# Patient Record
Sex: MALE | Race: WHITE
[De-identification: names, ages, dates, MRNs, and addresses within clinical notes are randomized per-mention and may not be internally consistent; named-entity substitution may affect disease eponyms.]

---

## 2020-06-19 ENCOUNTER — HOSPITAL ENCOUNTER (EMERGENCY)
Dept: HOSPITAL 47 - EC | Age: 50
LOS: 1 days | Discharge: HOME | End: 2020-06-20
Payer: COMMERCIAL

## 2020-06-19 VITALS — TEMPERATURE: 99.1 F

## 2020-06-19 DIAGNOSIS — S61.210A: Primary | ICD-10-CM

## 2020-06-19 DIAGNOSIS — Z79.899: ICD-10-CM

## 2020-06-19 DIAGNOSIS — E78.5: ICD-10-CM
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
## 2020-06-19 DIAGNOSIS — Z87.891: ICD-10-CM

## 2020-06-19 DIAGNOSIS — S61.214A: ICD-10-CM

## 2020-06-19 DIAGNOSIS — S61.212A: ICD-10-CM

## 2020-06-19 DIAGNOSIS — S61.216A: ICD-10-CM

## 2020-06-19 PROCEDURE — 99283 EMERGENCY DEPT VISIT LOW MDM: CPT

## 2020-06-20 VITALS — DIASTOLIC BLOOD PRESSURE: 80 MMHG | RESPIRATION RATE: 16 BRPM | SYSTOLIC BLOOD PRESSURE: 140 MMHG | HEART RATE: 82 BPM

## 2020-06-20 NOTE — XR
EXAMINATION TYPE: XR hand complete RT

 

DATE OF EXAM: 6/20/2020

 

COMPARISON: NONE

 

HISTORY: Dogbite. Pain.

 

TECHNIQUE: 3 views

 

FINDINGS: Metacarpals are intact. I see no fracture nor dislocation. Joint spaces appear normal. Soft
 tissues show possible laceration at the second MP joint.

 

IMPRESSION: Possible laceration. No fracture seen.

## 2020-06-20 NOTE — ED
Animal Bite HPI





- General


Chief Complaint: Animal Bite


Stated Complaint: Dog bite, RT hand


Time Seen by Provider: 06/19/20 23:26


Source: patient


Mode of arrival: ambulatory


Limitations: no limitations





- History of Present Illness


Initial Comments: 


49-year-old male patient presents to the emergency department today for 

evaluation of multiple bite wounds to the right hand.  Patient states that he 

was camping and a friend's dog bit his hand.  States that he did cleanse the 

area with soap and water and applied dressings and antibiotic ointment.  States 

injury occurred approximately 6-7 hours ago.  States he is having some numbness 

to the tip of the right fourth digit so he decided to come in for further 

evaluation.  He denies any difficulty with range of motion of the fingers or 

hand.  Denies any other injuries.  States his tetanus vaccine is up-to-date.  

States the dog is up-to-date on immunizations as well.  Patient denies any 

headache, neck pain, back pain, chest pain, shortness of breath, dizziness, 

weakness, abdominal pain, nausea, vomiting, or difficulties with bowel movements

or urination.








- Related Data


                                Home Medications











 Medication  Instructions  Recorded  Confirmed


 


Rosuvastatin Calcium [Crestor] 5 mg PO DAILY 06/19/20 06/19/20








                                  Previous Rx's











 Medication  Instructions  Recorded


 


Amoxic-Pot Clav 875-125Mg 1 tab PO Q12HR #20 tablet 06/20/20





[Augmentin 875-125]  











                                    Allergies











Allergy/AdvReac Type Severity Reaction Status Date / Time


 


No Known Allergies Allergy   Verified 06/19/20 23:28














Review of Systems


ROS Statement: 


Those systems with pertinent positive or pertinent negative responses have been 

documented in the HPI.





ROS Other: All systems not noted in ROS Statement are negative.





Past Medical History


Past Medical History: Hyperlipidemia


History of Any Multi-Drug Resistant Organisms: None Reported


Additional Past Surgical History / Comment(s): Eye surgery.


Past Psychological History: No Psychological Hx Reported


Smoking Status: Former smoker


Past Alcohol Use History: Occasional


Past Drug Use History: None Reported





General Exam


Limitations: no limitations


General appearance: alert, in no apparent distress, other (This is a well-

developed, well-nourished adult male patient in no acute distress.  Vital signs 

upon presentation are pulse 95, respirations 18, blood pressure 154/97, pulse ox

97% on room air.)


Respiratory exam: Present: normal lung sounds bilaterally.  Absent: respiratory 

distress, wheezes, rales, rhonchi, stridor


Cardiovascular Exam: Present: regular rate, normal rhythm, normal heart sounds. 

Absent: systolic murmur, diastolic murmur, rubs, gallop, clicks


Extremities exam: Present: full ROM, normal capillary refill, other (Patient has

multiple punctures and lacerations noted over the fingers on the right hand. 

There is a 2cm laceration noted over the medial aspect of the right 2nd mcp 

joint, a 3cm laceration over the palmar surface of the index finger over the PIP

joint.  Patient exhibits full range of motion with him without resistance.  Skin

is otherwise pink, warm, and dry.  Cap refills less than 3 seconds.  Radial 

pulses 2+ and equal bilaterally.).  Absent: normal inspection, tenderness, pedal

edema, joint swelling, calf tenderness


Neurological exam: Present: alert, oriented X3, CN II-XII intact


Psychiatric exam: Present: normal affect, normal mood


Skin exam: Present: warm, dry, intact, normal color.  Absent: rash





Course


                                   Vital Signs











  06/19/20 06/19/20 06/20/20





  23:20 23:28 01:03


 


Temperature  99.1 F 


 


Pulse Rate 95  82


 


Respiratory 18  16





Rate   


 


Blood Pressure 154/97  140/80


 


O2 Sat by Pulse 97  95





Oximetry   














Medical Decision Making





- Medical Decision Making


49-year-old male patient presented to the emergency department today for 

evaluation of dog bite to the right hand.  Physical examination did reveal 

multiple puncture wounds and lacerations noted over the second through fifth 

digits on the right hand.  Wounds had been cleansed and injury occurred about 6-

7 hours ago.  Given the nature of the injury and location we will not close the 

lacerations due to increased risk of infection.  He will be started on 

Augmentin.  He is instructed to follow-up with orthopedic specialty for further 

evaluation. Nursing staff again cleansed the wounds, applied bacitracin, and 

applied dressings.  Return parameters were discussed in detail.  He verbalizes 

understanding and agrees with this plan.








- Radiology Data


Radiology results: report reviewed, image reviewed


3 views of the right hand are obtained.  Report was reviewed in its entirety.  

Impression by Dr. Zayas shows possible laceration.  No fracture seen.





Disposition


Clinical Impression: 


 Dog bite of hand





Disposition: HOME SELF-CARE


Instructions (If sedation given, give patient instructions):  Animal Bite (ED)


Additional Instructions: 


Complete antibiotic prescription in full. Take tylenol and motrin for pain 

control. Follow up with orthopedic specialist for further evaluation as soon as 

possible. Return to the emergency department for any new, worsening, or 

concerning symptoms. 


Prescriptions: 


Amoxic-Pot Clav 875-125Mg [Augmentin 875-125] 1 tab PO Q12HR #20 tablet


Is patient prescribed a controlled substance at d/c from ED?: No


Referrals: 


Nonstaff,Physician [Primary Care Provider] - 1-2 days


Time of Disposition: 00:38